# Patient Record
Sex: MALE | Race: WHITE | NOT HISPANIC OR LATINO | Employment: OTHER | URBAN - METROPOLITAN AREA
[De-identification: names, ages, dates, MRNs, and addresses within clinical notes are randomized per-mention and may not be internally consistent; named-entity substitution may affect disease eponyms.]

---

## 2023-08-20 ENCOUNTER — HOSPITAL ENCOUNTER (OUTPATIENT)
Dept: DATA CONVERSION | Facility: HOSPITAL | Age: 83
Discharge: HOME | End: 2023-08-20

## 2023-08-20 DIAGNOSIS — Z79.01 LONG TERM (CURRENT) USE OF ANTICOAGULANTS: ICD-10-CM

## 2023-08-20 DIAGNOSIS — Z87.891 PERSONAL HISTORY OF NICOTINE DEPENDENCE: ICD-10-CM

## 2023-08-20 DIAGNOSIS — S01.01XA LACERATION WITHOUT FOREIGN BODY OF SCALP, INITIAL ENCOUNTER: ICD-10-CM
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
## 2023-08-20 DIAGNOSIS — S01.81XA LACERATION WITHOUT FOREIGN BODY OF OTHER PART OF HEAD, INITIAL ENCOUNTER: ICD-10-CM

## 2023-08-20 DIAGNOSIS — Z79.899 OTHER LONG TERM (CURRENT) DRUG THERAPY: ICD-10-CM

## 2023-08-20 DIAGNOSIS — S09.90XA UNSPECIFIED INJURY OF HEAD, INITIAL ENCOUNTER: ICD-10-CM

## 2023-08-20 LAB
ANION GAP SERPL CALCULATED.3IONS-SCNC: 12 MMOL/L (ref 0–19)
ANTICOAGULANT: NORMAL
BASOPHILS # BLD AUTO: 0.04 K/UL (ref 0–0.22)
BASOPHILS NFR BLD AUTO: 0.7 % (ref 0–1)
BUN SERPL-MCNC: 22 MG/DL (ref 8–25)
BUN/CREAT SERPL: 31.4 RATIO (ref 8–21)
CALCIUM SERPL-MCNC: 8.9 MG/DL (ref 8.5–10.4)
CHLORIDE SERPL-SCNC: 105 MMOL/L (ref 97–107)
CO2 SERPL-SCNC: 25 MMOL/L (ref 24–31)
CREAT SERPL-MCNC: 0.7 MG/DL (ref 0.4–1.6)
DEPRECATED RDW RBC AUTO: 44.1 FL (ref 37–54)
DIFFERENTIAL METHOD BLD: ABNORMAL
EOSINOPHIL # BLD AUTO: 0.17 K/UL (ref 0–0.45)
EOSINOPHIL NFR BLD: 3.2 % (ref 0–3)
ERYTHROCYTE [DISTWIDTH] IN BLOOD BY AUTOMATED COUNT: 12.8 % (ref 11.7–15)
ETHANOL SERPL-MCNC: <0.01 GM/DL (ref 0–0.01)
GFR SERPL CREATININE-BSD FRML MDRD: 90 ML/MIN/1.73 M2
GLUCOSE SERPL-MCNC: 127 MG/DL (ref 65–99)
HCT VFR BLD AUTO: 42.1 % (ref 41–50)
HGB BLD-MCNC: 14.5 GM/DL (ref 13.5–16.5)
HS TROPONIN T DELTA: ABNORMAL (ref 0–4)
IMM GRANULOCYTES # BLD AUTO: 0.02 K/UL (ref 0–0.1)
INR PPP: 1 (ref 0.86–1.16)
LACTATE BLDV-SCNC: 1.8 MMOL/L (ref 0.4–2)
LYMPHOCYTES # BLD AUTO: 1.16 K/UL (ref 1.2–3.2)
LYMPHOCYTES NFR BLD MANUAL: 21.6 % (ref 20–40)
MCH RBC QN AUTO: 32.6 PG (ref 26–34)
MCHC RBC AUTO-ENTMCNC: 34.4 % (ref 31–37)
MCV RBC AUTO: 94.6 FL (ref 80–100)
MONOCYTES # BLD AUTO: 0.48 K/UL (ref 0–0.8)
MONOCYTES NFR BLD MANUAL: 8.9 % (ref 0–8)
NEUTROPHILS # BLD AUTO: 3.5 K/UL
NEUTROPHILS # BLD AUTO: 3.5 K/UL (ref 1.8–7.7)
NEUTROPHILS.IMMATURE NFR BLD: 0.4 % (ref 0–1)
NEUTS SEG NFR BLD: 65.2 % (ref 50–70)
NRBC BLD-RTO: 0 /100 WBC
PLATELET # BLD AUTO: 255 K/UL (ref 150–450)
PMV BLD AUTO: 8.7 CU (ref 7–12.6)
POTASSIUM SERPL-SCNC: 4 MMOL/L (ref 3.4–5.1)
PROTHROMBIN TIME: 10.4 SEC (ref 9.3–12.7)
RBC # BLD AUTO: 4.45 M/UL (ref 4.5–5.5)
SODIUM SERPL-SCNC: 141 MMOL/L (ref 133–145)
TROPONIN T SERPL-MCNC: 19 NG/L
WBC # BLD AUTO: 5.4 K/UL (ref 4.5–11)

## 2023-09-08 PROBLEM — J30.0 VASOMOTOR RHINITIS: Status: ACTIVE | Noted: 2023-09-08

## 2023-09-08 PROBLEM — D72.819 LEUKOPENIA: Status: ACTIVE | Noted: 2023-09-08

## 2023-09-08 PROBLEM — I95.1 ORTHOSTATIC HYPOTENSION: Status: ACTIVE | Noted: 2023-09-08

## 2023-09-08 PROBLEM — E78.00 ELEVATED CHOLESTEROL: Status: ACTIVE | Noted: 2023-09-08

## 2023-09-08 PROBLEM — R63.4 WEIGHT LOSS, UNINTENTIONAL: Status: ACTIVE | Noted: 2023-09-08

## 2023-09-08 PROBLEM — G30.9 ALZHEIMER'S DEMENTIA WITHOUT BEHAVIORAL DISTURBANCE (MULTI): Status: ACTIVE | Noted: 2023-09-08

## 2023-09-08 PROBLEM — H81.90 VESTIBULAR DIZZINESS: Status: ACTIVE | Noted: 2023-09-08

## 2023-09-08 PROBLEM — R73.9 HYPERGLYCEMIA: Status: ACTIVE | Noted: 2023-09-08

## 2023-09-08 PROBLEM — E55.9 VITAMIN D DEFICIENCY: Status: ACTIVE | Noted: 2023-09-08

## 2023-09-08 PROBLEM — Z86.718 HISTORY OF BLOOD CLOTS: Status: ACTIVE | Noted: 2023-09-08

## 2023-09-08 PROBLEM — H90.3 SENSORINEURAL HEARING LOSS (SNHL) OF BOTH EARS: Status: ACTIVE | Noted: 2023-09-08

## 2023-09-08 PROBLEM — F02.80 ALZHEIMER'S DEMENTIA WITHOUT BEHAVIORAL DISTURBANCE (MULTI): Status: ACTIVE | Noted: 2023-09-08

## 2023-09-08 PROBLEM — H40.9 GLAUCOMA OF BOTH EYES: Status: ACTIVE | Noted: 2023-09-08

## 2023-09-08 PROBLEM — I82.409 DEEP VEIN THROMBOSIS (MULTI): Status: ACTIVE | Noted: 2023-09-08

## 2023-09-08 PROBLEM — R42 VERTIGO: Status: ACTIVE | Noted: 2023-09-08

## 2023-09-08 RX ORDER — PNV NO.95/FERROUS FUM/FOLIC AC 28MG-0.8MG
TABLET ORAL
COMMUNITY

## 2023-09-08 RX ORDER — GLUCOSAMINE HCL 500 MG
TABLET ORAL
COMMUNITY
End: 2024-02-15 | Stop reason: ALTCHOICE

## 2023-09-08 RX ORDER — DONEPEZIL HYDROCHLORIDE 10 MG/1
1 TABLET, FILM COATED ORAL DAILY
COMMUNITY

## 2023-09-08 RX ORDER — PETROLATUM,WHITE/LANOLIN
1 OINTMENT (GRAM) TOPICAL DAILY
COMMUNITY
End: 2024-02-15 | Stop reason: ALTCHOICE

## 2023-09-08 RX ORDER — TRAVOPROST OPHTHALMIC SOLUTION 0.04 MG/ML
1 SOLUTION OPHTHALMIC EVERY EVENING
COMMUNITY

## 2024-01-02 DIAGNOSIS — I82.5Y9 CHRONIC DEEP VEIN THROMBOSIS (DVT) OF PROXIMAL VEIN OF LOWER EXTREMITY, UNSPECIFIED LATERALITY (MULTI): Primary | ICD-10-CM

## 2024-01-02 RX ORDER — BRIMONIDINE TARTRATE 2 MG/ML
SOLUTION/ DROPS OPHTHALMIC
COMMUNITY
Start: 2023-11-12 | End: 2024-03-18 | Stop reason: WASHOUT

## 2024-01-02 RX ORDER — MOLNUPIRAVIR 200 MG/1
CAPSULE ORAL
COMMUNITY
Start: 2024-01-02 | End: 2024-02-15 | Stop reason: WASHOUT

## 2024-01-02 RX ORDER — DORZOLAMIDE HYDROCHLORIDE AND TIMOLOL MALEATE 20; 5 MG/ML; MG/ML
SOLUTION/ DROPS OPHTHALMIC
COMMUNITY
Start: 2023-11-22

## 2024-01-02 NOTE — TELEPHONE ENCOUNTER
LV 2/14/23, NV 2/14/24.  [facility] [address]

## 2024-02-05 DIAGNOSIS — F02.80 ALZHEIMER'S DEMENTIA WITHOUT BEHAVIORAL DISTURBANCE (MULTI): Primary | ICD-10-CM

## 2024-02-05 DIAGNOSIS — G30.9 ALZHEIMER'S DEMENTIA WITHOUT BEHAVIORAL DISTURBANCE (MULTI): Primary | ICD-10-CM

## 2024-02-13 PROBLEM — D50.9 IRON DEFICIENCY ANEMIA: Status: RESOLVED | Noted: 2018-05-08 | Resolved: 2024-02-13

## 2024-02-13 PROBLEM — J44.9 CHRONIC OBSTRUCTIVE LUNG DISEASE (MULTI): Status: RESOLVED | Noted: 2018-05-08 | Resolved: 2024-02-13

## 2024-02-13 PROBLEM — G30.1 LATE ONSET ALZHEIMER'S DISEASE WITHOUT BEHAVIORAL DISTURBANCE (MULTI): Status: RESOLVED | Noted: 2018-05-08 | Resolved: 2024-02-13

## 2024-02-13 PROBLEM — R97.20 RAISED PROSTATE SPECIFIC ANTIGEN: Status: RESOLVED | Noted: 2018-05-08 | Resolved: 2024-02-13

## 2024-02-13 PROBLEM — E78.49 HYPERLIPIDEMIA, FAMILIAL, HIGH LDL: Status: RESOLVED | Noted: 2018-05-08 | Resolved: 2024-02-13

## 2024-02-13 PROBLEM — I82.509 CHRONIC DEEP VEIN THROMBOSIS (DVT) OF LOWER EXTREMITY (MULTI): Status: RESOLVED | Noted: 2018-05-08 | Resolved: 2024-02-13

## 2024-02-13 PROBLEM — F02.80 LATE ONSET ALZHEIMER'S DISEASE WITHOUT BEHAVIORAL DISTURBANCE (MULTI): Status: RESOLVED | Noted: 2018-05-08 | Resolved: 2024-02-13

## 2024-02-13 PROBLEM — R55 SYNCOPE AND COLLAPSE: Status: RESOLVED | Noted: 2018-05-08 | Resolved: 2024-02-13

## 2024-02-15 ENCOUNTER — OFFICE VISIT (OUTPATIENT)
Dept: PRIMARY CARE | Facility: CLINIC | Age: 84
End: 2024-02-15
Payer: MEDICARE

## 2024-02-15 ENCOUNTER — LAB (OUTPATIENT)
Dept: LAB | Facility: LAB | Age: 84
End: 2024-02-15
Payer: MEDICARE

## 2024-02-15 VITALS
HEART RATE: 63 BPM | TEMPERATURE: 98.4 F | DIASTOLIC BLOOD PRESSURE: 70 MMHG | WEIGHT: 142 LBS | HEIGHT: 67 IN | SYSTOLIC BLOOD PRESSURE: 118 MMHG | BODY MASS INDEX: 22.29 KG/M2 | OXYGEN SATURATION: 99 %

## 2024-02-15 DIAGNOSIS — F02.80 ALZHEIMER'S DEMENTIA WITHOUT BEHAVIORAL DISTURBANCE (MULTI): ICD-10-CM

## 2024-02-15 DIAGNOSIS — Z12.5 SCREENING FOR PROSTATE CANCER: ICD-10-CM

## 2024-02-15 DIAGNOSIS — Z86.718 HISTORY OF BLOOD CLOTS: ICD-10-CM

## 2024-02-15 DIAGNOSIS — E55.9 VITAMIN D DEFICIENCY: ICD-10-CM

## 2024-02-15 DIAGNOSIS — Z00.00 ROUTINE GENERAL MEDICAL EXAMINATION AT HEALTH CARE FACILITY: Primary | ICD-10-CM

## 2024-02-15 DIAGNOSIS — R41.3 OTHER AMNESIA: Primary | ICD-10-CM

## 2024-02-15 DIAGNOSIS — G30.9 ALZHEIMER'S DEMENTIA WITHOUT BEHAVIORAL DISTURBANCE (MULTI): ICD-10-CM

## 2024-02-15 PROBLEM — H61.20 IMPACTED CERUMEN: Status: RESOLVED | Noted: 2024-02-15 | Resolved: 2024-02-15

## 2024-02-15 PROBLEM — H81.90 DISORDER OF LABYRINTH: Status: RESOLVED | Noted: 2023-09-08 | Resolved: 2024-02-15

## 2024-02-15 PROBLEM — F19.21 HISTORY OF SUBSTANCE DEPENDENCE (MULTI): Status: RESOLVED | Noted: 2023-08-20 | Resolved: 2024-02-15

## 2024-02-15 PROBLEM — L21.9 ACUTE SEBORRHEIC DERMATITIS: Status: RESOLVED | Noted: 2024-02-15 | Resolved: 2024-02-15
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
## 2024-02-15 PROBLEM — E78.00 HYPERCHOLESTEROLEMIA: Status: RESOLVED | Noted: 2023-09-08 | Resolved: 2024-02-15
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
## 2024-02-15 PROBLEM — R63.4 UNINTENDED WEIGHT LOSS: Status: RESOLVED | Noted: 2023-09-08 | Resolved: 2024-02-15
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
## 2024-02-15 PROBLEM — S09.90XA INJURY OF HEAD: Status: RESOLVED | Noted: 2024-02-15 | Resolved: 2024-02-15

## 2024-02-15 PROBLEM — S01.91XA LACERATION OF HEAD WITHOUT FOREIGN BODY: Status: RESOLVED | Noted: 2023-08-20 | Resolved: 2024-02-15

## 2024-02-15 LAB
25(OH)D3 SERPL-MCNC: 39 NG/ML (ref 31–100)
ALBUMIN SERPL-MCNC: 4.2 G/DL (ref 3.5–5)
ALP BLD-CCNC: 80 U/L (ref 35–125)
ALT SERPL-CCNC: 14 U/L (ref 5–40)
ANION GAP SERPL CALC-SCNC: 9 MMOL/L
AST SERPL-CCNC: 13 U/L (ref 5–40)
BASOPHILS # BLD AUTO: 0.06 X10*3/UL (ref 0–0.1)
BASOPHILS NFR BLD AUTO: 1.1 %
BILIRUB SERPL-MCNC: 1.1 MG/DL (ref 0.1–1.2)
BUN SERPL-MCNC: 18 MG/DL (ref 8–25)
CALCIUM SERPL-MCNC: 9.2 MG/DL (ref 8.5–10.4)
CHLORIDE SERPL-SCNC: 101 MMOL/L (ref 97–107)
CO2 SERPL-SCNC: 30 MMOL/L (ref 24–31)
CREAT SERPL-MCNC: 0.7 MG/DL (ref 0.4–1.6)
EGFRCR SERPLBLD CKD-EPI 2021: 90 ML/MIN/1.73M*2
EOSINOPHIL # BLD AUTO: 0.19 X10*3/UL (ref 0–0.4)
EOSINOPHIL NFR BLD AUTO: 3.4 %
ERYTHROCYTE [DISTWIDTH] IN BLOOD BY AUTOMATED COUNT: 13 % (ref 11.5–14.5)
GLUCOSE SERPL-MCNC: 141 MG/DL (ref 65–99)
HCT VFR BLD AUTO: 44.3 % (ref 41–52)
HGB BLD-MCNC: 14.6 G/DL (ref 13.5–17.5)
IMM GRANULOCYTES # BLD AUTO: 0 X10*3/UL (ref 0–0.5)
IMM GRANULOCYTES NFR BLD AUTO: 0 % (ref 0–0.9)
LYMPHOCYTES # BLD AUTO: 1.55 X10*3/UL (ref 0.8–3)
LYMPHOCYTES NFR BLD AUTO: 27.9 %
MCH RBC QN AUTO: 32.2 PG (ref 26–34)
MCHC RBC AUTO-ENTMCNC: 33 G/DL (ref 32–36)
MCV RBC AUTO: 98 FL (ref 80–100)
MONOCYTES # BLD AUTO: 0.6 X10*3/UL (ref 0.05–0.8)
MONOCYTES NFR BLD AUTO: 10.8 %
NEUTROPHILS # BLD AUTO: 3.15 X10*3/UL (ref 1.6–5.5)
NEUTROPHILS NFR BLD AUTO: 56.8 %
NRBC BLD-RTO: 0 /100 WBCS (ref 0–0)
PLATELET # BLD AUTO: 325 X10*3/UL (ref 150–450)
POTASSIUM SERPL-SCNC: 5 MMOL/L (ref 3.4–5.1)
PROT SERPL-MCNC: 7.1 G/DL (ref 5.9–7.9)
PSA SERPL-MCNC: 4 NG/ML
RBC # BLD AUTO: 4.53 X10*6/UL (ref 4.5–5.9)
SODIUM SERPL-SCNC: 140 MMOL/L (ref 133–145)
TSH SERPL DL<=0.05 MIU/L-ACNC: 2.68 MIU/L (ref 0.27–4.2)
VIT B12 SERPL-MCNC: 1298 PG/ML (ref 211–946)
WBC # BLD AUTO: 5.6 X10*3/UL (ref 4.4–11.3)

## 2024-02-15 PROCEDURE — 1036F TOBACCO NON-USER: CPT | Performed by: INTERNAL MEDICINE

## 2024-02-15 PROCEDURE — G0103 PSA SCREENING: HCPCS

## 2024-02-15 PROCEDURE — 1159F MED LIST DOCD IN RCRD: CPT | Performed by: INTERNAL MEDICINE

## 2024-02-15 PROCEDURE — 82306 VITAMIN D 25 HYDROXY: CPT

## 2024-02-15 PROCEDURE — 36415 COLL VENOUS BLD VENIPUNCTURE: CPT

## 2024-02-15 PROCEDURE — 99215 OFFICE O/P EST HI 40 MIN: CPT | Performed by: INTERNAL MEDICINE

## 2024-02-15 PROCEDURE — G0439 PPPS, SUBSEQ VISIT: HCPCS | Performed by: INTERNAL MEDICINE

## 2024-02-15 PROCEDURE — 82607 VITAMIN B-12: CPT

## 2024-02-15 PROCEDURE — 80053 COMPREHEN METABOLIC PANEL: CPT

## 2024-02-15 PROCEDURE — 84443 ASSAY THYROID STIM HORMONE: CPT

## 2024-02-15 PROCEDURE — 1157F ADVNC CARE PLAN IN RCRD: CPT | Performed by: INTERNAL MEDICINE

## 2024-02-15 PROCEDURE — 85025 COMPLETE CBC W/AUTO DIFF WBC: CPT

## 2024-02-15 PROCEDURE — 1126F AMNT PAIN NOTED NONE PRSNT: CPT | Performed by: INTERNAL MEDICINE

## 2024-02-15 PROCEDURE — 1124F ACP DISCUSS-NO DSCNMKR DOCD: CPT | Performed by: INTERNAL MEDICINE

## 2024-02-15 PROCEDURE — 99213 OFFICE O/P EST LOW 20 MIN: CPT | Performed by: INTERNAL MEDICINE

## 2024-02-15 PROCEDURE — 86780 TREPONEMA PALLIDUM: CPT

## 2024-02-15 RX ORDER — BESIFLOXACIN 6 MG/ML
SUSPENSION OPHTHALMIC
COMMUNITY
Start: 2024-02-02 | End: 2024-02-15 | Stop reason: ALTCHOICE

## 2024-02-15 RX ORDER — DIFLUPREDNATE OPHTHALMIC 0.5 MG/ML
1 EMULSION OPHTHALMIC
COMMUNITY
Start: 2024-02-01 | End: 2024-03-18 | Stop reason: WASHOUT

## 2024-02-15 ASSESSMENT — LIFESTYLE VARIABLES
HOW MANY STANDARD DRINKS CONTAINING ALCOHOL DO YOU HAVE ON A TYPICAL DAY: PATIENT DOES NOT DRINK
SKIP TO QUESTIONS 9-10: 1
AUDIT TOTAL SCORE: 0
HOW OFTEN DURING THE LAST YEAR HAVE YOU BEEN UNABLE TO REMEMBER WHAT HAPPENED THE NIGHT BEFORE BECAUSE YOU HAD BEEN DRINKING: NEVER
HOW OFTEN DURING THE LAST YEAR HAVE YOU NEEDED AN ALCOHOLIC DRINK FIRST THING IN THE MORNING TO GET YOURSELF GOING AFTER A NIGHT OF HEAVY DRINKING: NEVER
HOW OFTEN DO YOU HAVE A DRINK CONTAINING ALCOHOL: NEVER
AUDIT-C TOTAL SCORE: 0
HOW OFTEN DURING THE LAST YEAR HAVE YOU FAILED TO DO WHAT WAS NORMALLY EXPECTED FROM YOU BECAUSE OF DRINKING: NEVER
HOW OFTEN DURING THE LAST YEAR HAVE YOU HAD A FEELING OF GUILT OR REMORSE AFTER DRINKING: NEVER
HOW OFTEN DURING THE LAST YEAR HAVE YOU FOUND THAT YOU WERE NOT ABLE TO STOP DRINKING ONCE YOU HAD STARTED: NEVER
HAVE YOU OR SOMEONE ELSE BEEN INJURED AS A RESULT OF YOUR DRINKING: NO
HOW OFTEN DO YOU HAVE SIX OR MORE DRINKS ON ONE OCCASION: NEVER
HAS A RELATIVE, FRIEND, DOCTOR, OR ANOTHER HEALTH PROFESSIONAL EXPRESSED CONCERN ABOUT YOUR DRINKING OR SUGGESTED YOU CUT DOWN: NO

## 2024-02-15 ASSESSMENT — ENCOUNTER SYMPTOMS
LOSS OF SENSATION IN FEET: 0
DEPRESSION: 0
OCCASIONAL FEELINGS OF UNSTEADINESS: 1

## 2024-02-15 ASSESSMENT — PATIENT HEALTH QUESTIONNAIRE - PHQ9
2. FEELING DOWN, DEPRESSED OR HOPELESS: NOT AT ALL
1. LITTLE INTEREST OR PLEASURE IN DOING THINGS: NOT AT ALL
SUM OF ALL RESPONSES TO PHQ9 QUESTIONS 1 AND 2: 0

## 2024-02-15 ASSESSMENT — PAIN SCALES - GENERAL: PAINLEVEL: 0-NO PAIN

## 2024-02-15 NOTE — PROGRESS NOTES
[facility]: [facility]  PROGRESS NOTE      Haley Mayroga is a 83 y.o. male that is being seen  today for Annual Exam.  Subjective   Patient is a 83-year-old male with a history of dementia, history of blood clots in his legs on Eliquis, glaucoma who is being seen for subsequent wellness exam.  Patient is accompanied by her daughter.  Patient has been doing overall well.  No episode of bleeding.  No recent falls or hospitalization.  No change in medications.  Patient's blood pressure is fairly controlled.  Weight has been stable.  Patient denies any significant complaints.  Patient is due for his blood work to be done.  Previous labs reviewed.      ROS  Negative for fever or chills  Negative for sore throat, ear pain, nasal discharge  Negative for cough, shortness of breath or wheezing  Negative for chest pain, palpitations, swelling of legs  Negative for abdominal pain, constipation, diarrhea, blood in the stools  Negative for urinary complaints  Negative for headache, dizziness, weakness or numbness.  Patient has dementia Negative for joint pain  Negative for depression or anxiety  All other systems reviewed and were negative   Vitals:    02/15/24 1607   BP: 118/70   Pulse: 63   Temp: 36.9 °C (98.4 °F)   SpO2: 99%      Vitals:    02/15/24 1607   Weight: 64.4 kg (142 lb)     Body mass index is 22.58 kg/m².  Physical Exam  Constitutional: Patient does not appear to be in any acute distress  Head and Face: NCAT  Eyes: Normal external exam, EOMI  ENT: Normal external inspection of ears and nose. Oropharynx normal.  Cardiovascular: RRR, S1/S2, no murmurs, rubs, or gallops, radial pulses +2, no edema of extremities  Pulmonary: CTAB, no respiratory distress.  Abdomen: +BS, soft, non-tender, nondistended, no guarding or rebound, no masses noted  MSK: No joint swelling, normal movements of all extremities. Range of motion- normal.  Skin- No lesions, contusions, or erythema.  Peripheral puslses  "palpable bilaterally 2+  Neuro: Patient is alert recounted x 1 has dementia and is pleasantly confused, Cranial nerves 2-12 grossly intact,DTR 2+ in all 4 limbs   Psychiatric: Judgment intact. Appropriate mood and behavior    LABS   [unfilled]  Lab Results   Component Value Date    GLUCOSE 141 (H) 02/15/2024    CALCIUM 9.2 02/15/2024     02/15/2024    K 5.0 02/15/2024    CO2 30 02/15/2024     02/15/2024    BUN 18 02/15/2024    CREATININE 0.70 02/15/2024     Lab Results   Component Value Date    ALT 14 02/15/2024    AST 13 02/15/2024    ALKPHOS 80 02/15/2024    BILITOT 1.1 02/15/2024     Lab Results   Component Value Date    WBC 5.6 02/15/2024    HGB 14.6 02/15/2024    HCT 44.3 02/15/2024    MCV 98 02/15/2024     02/15/2024     Lab Results   Component Value Date    CHOL 192 02/17/2022    CHOL 180 12/18/2019    CHOL 182 01/03/2019     Lab Results   Component Value Date    HDL 80 02/17/2022    HDL 85 12/18/2019    HDL 74 01/03/2019     Lab Results   Component Value Date    LDLCALC 96 02/17/2022    LDLCALC 82 12/18/2019    LDLCALC 90 01/03/2019     Lab Results   Component Value Date    TRIG 80 02/17/2022    TRIG 63 12/18/2019    TRIG 90 01/03/2019     No results found for: \"HGBA1C\"  Other labs not included in the list above were reviewed either before or during this encounter.    History    Past Medical History:   Diagnosis Date    Abnormal weight loss 08/10/2016    Weight loss, unintentional    Accidental fall 02/15/2024    Acute embolism and thrombosis of unspecified deep veins of unspecified lower extremity (CMS/[facility]) 05/02/2017    Acute embolism and thrombosis of unspecified deep veins of unspecified lower extremity    Acute seborrheic dermatitis 02/15/2024    Alzheimer's disease, unspecified (CODE) (CMS/[facility]) 11/14/2017    Alzheimer's dementia without behavioral disturbance    Chronic deep vein thrombosis (DVT) of lower extremity (CMS/[facility]) 05/08/2018    Chronic obstructive lung disease (CMS/[facility]) " 05/08/2018    Decreased white blood cell count, unspecified 05/02/2017    Leukopenia    Dementia without behavioral disturbance, psychotic disturbance, mood disturbance, or anxiety, unspecified dementia severity, unspecified dementia type (CMS/[facility])     Disorder of labyrinth 09/08/2023    Dizziness     Fall on same level from slipping, tripping or stumbling 08/20/2023    Glaucoma of both eyes, unspecified glaucoma type     History of blood clots     History of substance dependence (CMS/[facility]) 08/20/2023    Hypercholesterolemia 09/08/2023    Hyperglycemia, unspecified 05/02/2017    Hyperglycemia    Hyperlipidemia, familial, high LDL 05/08/2018    Impacted cerumen 02/15/2024    Injury of head 02/15/2024    Insect bite wound 02/15/2024    Iron deficiency anemia 05/08/2018    Laceration of head without foreign body 08/20/2023    Late onset Alzheimer's disease without behavioral disturbance (CMS/[facility]) 05/08/2018    Long term (current) use of anticoagulants 08/10/2016    Anticoagulated with warfarin    Orthostatic hypotension 05/02/2017    Orthostatic hypotension    Raised prostate specific antigen 05/08/2018    Seborrheic dermatitis, unspecified 04/01/2016    Acute seborrheic dermatitis    Syncope and collapse 05/08/2018    Unintended weight loss 09/08/2023    Unspecified disorder of vestibular function, unspecified ear 05/02/2017    Vestibular dizziness    Vasomotor rhinitis 07/29/2015    Vasomotor rhinitis    Vitamin D deficiency      Past Surgical History:   Procedure Laterality Date    CIRCUMCISION, PRIMARY  1938    EYE SURGERY  2/8/2024    OTHER SURGICAL HISTORY  02/17/2022    No history of surgery     Family History   Problem Relation Name Age of Onset    Arthritis Mother Petrona     Breast cancer Mother Petrona     Vision loss Mother Petrona     Arthritis Father Cole      No Known Allergies  Current Outpatient Medications on File Prior to Visit   Medication Sig Dispense Refill    apixaban (Eliquis) 2.5 mg tablet Take 1  tablet (2.5 mg) by mouth 2 times a day. For 90 days 180 tablet 0    brimonidine (AlphaGAN) 0.2 % ophthalmic solution       calcium carbonate/vitamin D3 (CALCIUM + D ORAL) Calcium + D      cyanocobalamin (Vitamin B-12) 100 mcg tablet Take by mouth. As directed      difluprednate (Durezol) 0.05 % ophthalmic solution Administer 1 drop into the right eye.      donepezil (Aricept) 10 mg tablet Take 1 tablet (10 mg) by mouth once daily. For 90 days      dorzolamide-timoloL (Cosopt) 22.3-6.8 mg/mL ophthalmic solution       travoprost (Travatan Z) 0.004 % drops ophthalmic solution Administer 1 drop into affected eye(s) once daily in the evening.       No current facility-administered medications on file prior to visit.     Immunization History   Administered Date(s) Administered    Flu vaccine (IIV4), preservative free *Check age/dose* 11/14/2017, 09/19/2020    Flu vaccine, quadrivalent, high-dose, preservative free, age 65y+ (FLUZONE) 12/15/2021, 09/29/2022    Influenza, High Dose Seasonal, Preservative Free 10/04/2016, 09/17/2018    Influenza, Seasonal, Quadrivalent, Adjuvanted 09/28/2023    Influenza, Unspecified 10/21/2009, 10/06/2010, 10/05/2011    Influenza, seasonal, injectable, preservative free 10/29/2015, 08/31/2016    Influenza, trivalent, adjuvanted 09/05/2018, 08/19/2019    Moderna SARS-CoV-2 Vaccination 02/05/2021, 03/05/2021, 01/05/2022    Pfizer COVID-19 vaccine, Fall 2023, 12 years and older, (30mcg/0.3mL) 09/28/2023    Pfizer COVID-19 vaccine, bivalent, age 12 years and older (30 mcg/0.3 mL) 09/29/2022    Pneumococcal conjugate vaccine, 13-valent (PREVNAR 13) 07/29/2015    Pneumococcal polysaccharide vaccine, 23-valent, age 2 years and older (PNEUMOVAX 23) 03/01/2012, 08/19/2019    Tdap vaccine, age 7 year and older (BOOSTRIX, ADACEL) 07/29/2015    Zoster vaccine, recombinant, adult (SHINGRIX) 09/19/2020, 11/20/2020     Patient's medical history was reviewed and updated either before or during this  encounter.  ASSESSMENT / PLAN:  Diagnoses and all orders for this visit:  Routine general medical examination at [facility]  Alzheimer's dementia without behavioral disturbance (CMS/HCC)  History of blood clots  Vitamin D deficiency  -     Vitamin D 25-Hydroxy,Total (for eval of Vitamin D levels); Future  Screening for prostate cancer  -     Prostate Specific Antigen; Future    Patient is being seen for annual wellness exam.  Patient has history of blood clots and is on Eliquis.  Patient also has issues with low vitamin D levels in the past.  Patient need to be screened for prostate cancer.  Patient's immunization record reviewed and is up-to-date on pneumonia vaccine.      Gadiel Harkins MD

## 2024-02-16 LAB — TREPONEMA PALLIDUM IGG+IGM AB [PRESENCE] IN SERUM OR PLASMA BY IMMUNOASSAY: NONREACTIVE

## 2024-02-27 ENCOUNTER — APPOINTMENT (OUTPATIENT)
Dept: RADIOLOGY | Facility: HOSPITAL | Age: 84
End: 2024-02-27
Payer: MEDICARE

## 2024-03-15 ENCOUNTER — HOSPITAL ENCOUNTER (EMERGENCY)
Facility: HOSPITAL | Age: 84
Discharge: HOME | End: 2024-03-15
Attending: STUDENT IN AN ORGANIZED HEALTH CARE EDUCATION/TRAINING PROGRAM
Payer: MEDICARE

## 2024-03-15 ENCOUNTER — APPOINTMENT (OUTPATIENT)
Dept: CARDIOLOGY | Facility: HOSPITAL | Age: 84
End: 2024-03-15
Payer: MEDICARE

## 2024-03-15 VITALS
WEIGHT: 150.57 LBS | DIASTOLIC BLOOD PRESSURE: 75 MMHG | HEART RATE: 65 BPM | OXYGEN SATURATION: 99 % | HEIGHT: 67 IN | SYSTOLIC BLOOD PRESSURE: 138 MMHG | RESPIRATION RATE: 16 BRPM | TEMPERATURE: 97.5 F | BODY MASS INDEX: 23.63 KG/M2

## 2024-03-15 DIAGNOSIS — F03.90 DEMENTIA, UNSPECIFIED DEMENTIA SEVERITY, UNSPECIFIED DEMENTIA TYPE, UNSPECIFIED WHETHER BEHAVIORAL, PSYCHOTIC, OR MOOD DISTURBANCE OR ANXIETY (MULTI): Primary | ICD-10-CM

## 2024-03-15 LAB
ATRIAL RATE: 58 BPM
P AXIS: 65 DEGREES
P OFFSET: 152 MS
P ONSET: 106 MS
PR INTERVAL: 210 MS
Q ONSET: 211 MS
QRS COUNT: 10 BEATS
QRS DURATION: 84 MS
QT INTERVAL: 438 MS
QTC CALCULATION(BAZETT): 429 MS
QTC FREDERICIA: 432 MS
R AXIS: -77 DEGREES
T AXIS: 46 DEGREES
T OFFSET: 430 MS
VENTRICULAR RATE: 58 BPM

## 2024-03-15 PROCEDURE — 93005 ELECTROCARDIOGRAM TRACING: CPT

## 2024-03-15 PROCEDURE — 99283 EMERGENCY DEPT VISIT LOW MDM: CPT | Mod: 25 | Performed by: STUDENT IN AN ORGANIZED HEALTH CARE EDUCATION/TRAINING PROGRAM

## 2024-03-15 ASSESSMENT — PAIN - FUNCTIONAL ASSESSMENT: PAIN_FUNCTIONAL_ASSESSMENT: 0-10

## 2024-03-15 ASSESSMENT — PAIN SCALES - GENERAL: PAINLEVEL_OUTOF10: 0 - NO PAIN

## 2024-03-15 NOTE — DISCHARGE INSTRUCTIONS
Return to the emergency department with any worsening symptoms.    It is important to remember that your care does not end here and you must continue to monitor your condition closely. Please return to the emergency department for any worsening or concerning signs or symptoms as directed by our conversations and the discharge instructions. If you do not have a doctor please contact the referral number on your discharge instructions. Please contact any physician specialists provided in your discharge notes as it is very important to follow up with them regarding your condition. If you are unable to reach the physicians provided, please come back to the Emergency Department at any time.    Return to emergency room without delay for ANY new or worsening pains or for any other symptoms or concerns.  Return with worsening pains, nausea, vomiting, trouble breathing, palpitations, shortness of breath, inability to pass stool or urine, loss of control of stool or urine, any numbness or tingling (that is not normal for you), uncontrolled fevers, the passing of blood or other material in stool or urine, rashes, pains or for any other symptoms or concerns you may have.  You are always welcome to return to the ER at any time for any reason or for any other concerns you may have.

## 2024-03-15 NOTE — ED NOTES
This RN assumed care of pt at this time   Pt presents to ED via EMS after being found wandering outside with AMS  Pt poor historian at this time.   Pt AxOx2-3. Alert to self and place.   Pt in NAD, respirations even and unlabored  Pt denies chest pain, abdominal pain, SOB, n/v/d.   Pt resting comfortably in bed with family bedside at this time.   [facility] negative.      Chelita Nolasco RN  03/15/24 0796

## 2024-03-15 NOTE — ED TRIAGE NOTES
Daughter of patient (Sabiha) was called and notified that the patient was here and safe after being found outside of the residence. Patient has alzheimer's and dementia and is being placed in a memory care unit in 10 days with his wife, daughter is on the way to [facility]. Per daughter this is baseline mentation of patient

## 2024-03-15 NOTE — ED PROVIDER NOTES
HPI   No chief complaint on file.      Patient was seen wandering outside by a local .  With concerns for his safety, 911 was called.  On ambulance arrival, patient was able to state his name but not able to give any other information.  Patient was then brought to the hospital.                          Mary Coma Scale Score: 14                     Patient History   Past Medical History:   Diagnosis Date    Abnormal weight loss 08/10/2016    Weight loss, unintentional    Accidental fall 02/15/2024    Acute embolism and thrombosis of unspecified deep veins of unspecified lower extremity (CMS/[facility]) 05/02/2017    Acute embolism and thrombosis of unspecified deep veins of unspecified lower extremity    Acute seborrheic dermatitis 02/15/2024    Alzheimer's disease, unspecified (CODE) (CMS/[facility]) 11/14/2017    Alzheimer's dementia without behavioral disturbance    Chronic deep vein thrombosis (DVT) of lower extremity (CMS/[facility]) 05/08/2018    Chronic obstructive lung disease (CMS/[facility]) 05/08/2018    Decreased white blood cell count, unspecified 05/02/2017    Leukopenia    Dementia without behavioral disturbance, psychotic disturbance, mood disturbance, or anxiety, unspecified dementia severity, unspecified dementia type (CMS/[facility])     Disorder of labyrinth 09/08/2023    Dizziness     Fall on same level from slipping, tripping or stumbling 08/20/2023    Glaucoma of both eyes, unspecified glaucoma type     History of blood clots     History of substance dependence (CMS/[facility]) 08/20/2023    Hypercholesterolemia 09/08/2023    Hyperglycemia, unspecified 05/02/2017    Hyperglycemia    Hyperlipidemia, familial, high LDL 05/08/2018    Impacted cerumen 02/15/2024    Injury of head 02/15/2024    Insect bite wound 02/15/2024    Iron deficiency anemia 05/08/2018    Laceration of head without foreign body 08/20/2023    Late onset Alzheimer's disease without behavioral disturbance (CMS/[facility]) 05/08/2018    Long term (current) use of  anticoagulants 08/10/2016    Anticoagulated with warfarin    Orthostatic hypotension 2017    Orthostatic hypotension    Raised prostate specific antigen 2018    Seborrheic dermatitis, unspecified 2016    Acute seborrheic dermatitis    Syncope and collapse 2018    Unintended weight loss 2023    Unspecified disorder of vestibular function, unspecified ear 2017    Vestibular dizziness    Vasomotor rhinitis 2015    Vasomotor rhinitis    Vitamin D deficiency      Past Surgical History:   Procedure Laterality Date    CIRCUMCISION, PRIMARY  1938    EYE SURGERY  2024    OTHER SURGICAL HISTORY  2022    No history of surgery     Family History   Problem Relation Name Age of Onset    Arthritis Mother Petrona     Breast cancer Mother Petrona     Vision loss Mother Petrona     Arthritis Father Cole      Social History     Tobacco Use    Smoking status: Former     Packs/day: 0.50     Years: 5.00     Additional pack years: 0.00     Total pack years: 2.50     Types: Cigarettes, Pipe     Start date: 5/15/1958     Quit date: 5/15/1965     Years since quittin.8    Smokeless tobacco: Never    Tobacco comments:     Quit  unsure of start   Vaping Use    Vaping Use: Never used   Substance Use Topics    Alcohol use: Not Currently     Alcohol/week: 5.0 standard drinks of alcohol     Types: 5 Glasses of wine per week    Drug use: Never       Physical Exam   ED Triage Vitals   Temp Pulse Resp BP   -- -- -- --      SpO2 Temp src Heart Rate Source Patient Position   -- -- -- --      BP Location FiO2 (%)     -- --       Physical Exam  HENT:      Head: Normocephalic.   Eyes:      General: No scleral icterus.  Cardiovascular:      Rate and Rhythm: Normal rate.   Pulmonary:      Effort: Pulmonary effort is normal.   Neurological:      Mental Status: He is alert.      Comments: Patient awake, alert, able to answer some questions but is confused.  Moves all extremities.         ED Course &  [facility]   ED Course as of 03/15/24 0750   Fri Mar 15, 2024   0746 EKG interpretation: Sinus bradycardia, rate 58.  Leftward axis.  Possible LAFB.  No significant ST or T wave abnormalities.  Normal QRS and QTc durations. [ML]      ED Course User Index  [ML] Thomas Nj MD         Diagnoses as of 03/15/24 0750   Dementia, unspecified dementia severity, unspecified dementia type, unspecified whether behavioral, psychotic, or mood disturbance or anxiety (CMS/[facility])       Medical Decision Making  Patient's daughter able to be contacted and states that he does have significant dementia.  She confirms that it would be normal for him to only know his name but not be able to answer other questions.  Vital signs are unremarkable.  Daughter and wife then present to the emergency department.  They verify that patient is acting normally for him.  They agreed to keep patient safe.  They have plans to have him live in a memory care unit shortly.  Return precautions given for any worsening symptoms.  Parts of this chart were completed with dictation software, please excuse any errors in transcription.        Procedure  Procedures     Thomas Nj MD  03/15/24 0752

## 2024-03-18 DIAGNOSIS — I82.5Y9 CHRONIC DEEP VEIN THROMBOSIS (DVT) OF PROXIMAL VEIN OF LOWER EXTREMITY, UNSPECIFIED LATERALITY (MULTI): ICD-10-CM

## 2024-03-18 NOTE — TELEPHONE ENCOUNTER
----- Message from Haley Mayorga sent at 3/18/2024  3:55 PM EDT -----  Regarding: Eliquis refill  Contact: [phone]  Dear Dr. Harkins and  his office,    Would you be able to call in a refill for Eliquis? We have about a month remaining and I checked with [facility], they do not have any refills left on Eliquis.  If possible, could you call it in to [facility] in [place] as it is easier for me to ? Their number is [phone].    Thank you very much

## 2025-02-20 ENCOUNTER — APPOINTMENT (OUTPATIENT)
Dept: PRIMARY CARE | Facility: CLINIC | Age: 85
End: 2025-02-20
Payer: MEDICARE